# Patient Record
Sex: MALE | Race: WHITE | Employment: FULL TIME | URBAN - NONMETROPOLITAN AREA
[De-identification: names, ages, dates, MRNs, and addresses within clinical notes are randomized per-mention and may not be internally consistent; named-entity substitution may affect disease eponyms.]

---

## 2018-07-30 ENCOUNTER — OFFICE VISIT (OUTPATIENT)
Dept: FAMILY MEDICINE CLINIC | Age: 38
End: 2018-07-30
Payer: COMMERCIAL

## 2018-07-30 VITALS
HEIGHT: 72 IN | WEIGHT: 173 LBS | HEART RATE: 88 BPM | DIASTOLIC BLOOD PRESSURE: 62 MMHG | SYSTOLIC BLOOD PRESSURE: 118 MMHG | BODY MASS INDEX: 23.43 KG/M2

## 2018-07-30 DIAGNOSIS — E86.0 DEHYDRATION: ICD-10-CM

## 2018-07-30 DIAGNOSIS — Z00.00 WELL ADULT EXAM: Primary | ICD-10-CM

## 2018-07-30 DIAGNOSIS — Z13.220 SCREENING, LIPID: ICD-10-CM

## 2018-07-30 DIAGNOSIS — R53.83 FATIGUE, UNSPECIFIED TYPE: ICD-10-CM

## 2018-07-30 LAB
GLUCOSE BLD-MCNC: 151 MG/DL
HBA1C MFR BLD: 5.3 %

## 2018-07-30 PROCEDURE — 99395 PREV VISIT EST AGE 18-39: CPT | Performed by: FAMILY MEDICINE

## 2018-07-30 PROCEDURE — 82962 GLUCOSE BLOOD TEST: CPT | Performed by: FAMILY MEDICINE

## 2018-07-30 PROCEDURE — 83036 HEMOGLOBIN GLYCOSYLATED A1C: CPT | Performed by: FAMILY MEDICINE

## 2018-07-30 RX ORDER — TRAZODONE HYDROCHLORIDE 50 MG/1
50 TABLET ORAL
COMMUNITY
End: 2018-11-08 | Stop reason: SDUPTHER

## 2018-07-30 ASSESSMENT — ENCOUNTER SYMPTOMS: NAUSEA: 0

## 2018-07-30 ASSESSMENT — PATIENT HEALTH QUESTIONNAIRE - PHQ9
2. FEELING DOWN, DEPRESSED OR HOPELESS: 0
SUM OF ALL RESPONSES TO PHQ QUESTIONS 1-9: 0
1. LITTLE INTEREST OR PLEASURE IN DOING THINGS: 0
SUM OF ALL RESPONSES TO PHQ9 QUESTIONS 1 & 2: 0

## 2018-07-30 NOTE — PROGRESS NOTES
Respiratory:        Sleep Apnea, using [facility]PaP since May of 2017 and not going well for him, he is working with Dr. Tisha Weaver is working with him to get parts to make it better. Gastrointestinal: Negative for nausea. Endocrine:        Drinks water all day, has not noticed increase in thirst   Genitourinary: Negative for difficulty urinating and dysuria. Up one time every night for urination   Neurological: Positive for dizziness (Once, last Thursday) and light-headedness (Off and on at different times of the day for the past 7-10 days). Psychiatric/Behavioral: Positive for sleep disturbance (Not sleeping well at night). Only noted with being upright, such as standing up out of the car     Objective:     /62   Pulse 88   Ht 6' (1.829 m)   Wt 173 lb (78.5 kg)   BMI 23.46 kg/m²     Physical Exam   Constitutional: He is oriented to person, place, and time. He appears well-developed and well-nourished. No distress. HENT:   Head: Atraumatic. Eyes: Conjunctivae are normal.   Neck: Neck supple. Carotid bruit is not present. No thyromegaly present. Cardiovascular: Normal rate and regular rhythm. No murmur heard. Pulmonary/Chest: Effort normal and breath sounds normal.   Musculoskeletal:        Right lower leg: He exhibits no swelling. Left lower leg: He exhibits no swelling. Lymphadenopathy:     He has no cervical adenopathy. Neurological: He is alert and oriented to person, place, and time. Vitals reviewed. Noted BMI at 23  - non fasting  3 hours ago ate  A1C 5.3      Assessment:     1. Well adult exam    2. Dehydration    3. Screening, lipid      No results found for this visit on 07/30/18.         Plan:     Orders Placed This Encounter   Procedures    Lipid Panel     Standing Status:   Future     Standing Expiration Date:   7/30/2019     Order Specific Question:   Is Patient Fasting?/# of Hours     Answer:   10-12       No orders of the defined types were placed in this encounter. Reviewed monitoring fluid intake. If not improving with change in fluid intake and limiting salt intake may call back for further evaluation    Encouraged to quit chew    Return in about 3 years (around 7/30/2021), or if symptoms worsen or fail to improve. Discussed use, benefit, and side effects of prescribed medications. All patient questions answered. Pt voiced understanding. Reviewed health maintenance- Tdap pt will check on last done. Instructed to continue current medications, diet and exercise. Patient agreed with treatment plan. Follow up as directed.      Electronically signed by Angelica Robles MD on 7/30/2018

## 2018-08-29 PROBLEM — E86.0 DEHYDRATION: Status: RESOLVED | Noted: 2018-07-30 | Resolved: 2018-08-29

## 2018-11-08 ENCOUNTER — OFFICE VISIT (OUTPATIENT)
Dept: FAMILY MEDICINE CLINIC | Age: 38
End: 2018-11-08
Payer: COMMERCIAL

## 2018-11-08 VITALS
WEIGHT: 170 LBS | HEIGHT: 72 IN | HEART RATE: 64 BPM | DIASTOLIC BLOOD PRESSURE: 62 MMHG | BODY MASS INDEX: 23.03 KG/M2 | SYSTOLIC BLOOD PRESSURE: 118 MMHG

## 2018-11-08 DIAGNOSIS — Z72.0 TOBACCO USE: ICD-10-CM

## 2018-11-08 DIAGNOSIS — F51.01 PRIMARY INSOMNIA: Primary | ICD-10-CM

## 2018-11-08 DIAGNOSIS — M25.562 ACUTE PAIN OF LEFT KNEE: ICD-10-CM

## 2018-11-08 PROCEDURE — 99213 OFFICE O/P EST LOW 20 MIN: CPT | Performed by: FAMILY MEDICINE

## 2018-11-08 RX ORDER — TRAZODONE HYDROCHLORIDE 50 MG/1
50 TABLET ORAL NIGHTLY
Qty: 90 TABLET | Refills: 3 | Status: SHIPPED | OUTPATIENT
Start: 2018-11-08 | End: 2019-11-25 | Stop reason: SDUPTHER

## 2019-01-28 ENCOUNTER — OFFICE VISIT (OUTPATIENT)
Dept: FAMILY MEDICINE CLINIC | Age: 39
End: 2019-01-28
Payer: COMMERCIAL

## 2019-01-28 VITALS
WEIGHT: 170.3 LBS | SYSTOLIC BLOOD PRESSURE: 112 MMHG | HEIGHT: 72 IN | BODY MASS INDEX: 23.07 KG/M2 | OXYGEN SATURATION: 98 % | HEART RATE: 59 BPM | DIASTOLIC BLOOD PRESSURE: 80 MMHG

## 2019-01-28 DIAGNOSIS — Z00.00 WELL ADULT EXAM: Primary | ICD-10-CM

## 2019-01-28 DIAGNOSIS — M25.522 LEFT ELBOW PAIN: ICD-10-CM

## 2019-01-28 DIAGNOSIS — Z72.0 TOBACCO USE: Chronic | ICD-10-CM

## 2019-01-28 PROCEDURE — 99213 OFFICE O/P EST LOW 20 MIN: CPT | Performed by: FAMILY MEDICINE

## 2019-01-28 PROCEDURE — 99395 PREV VISIT EST AGE 18-39: CPT | Performed by: FAMILY MEDICINE

## 2019-01-28 RX ORDER — MELOXICAM 15 MG/1
15 TABLET ORAL DAILY
Qty: 30 TABLET | Refills: 1 | Status: SHIPPED | OUTPATIENT
Start: 2019-01-28 | End: 2019-05-31 | Stop reason: ALTCHOICE

## 2019-02-23 ENCOUNTER — PATIENT MESSAGE (OUTPATIENT)
Dept: FAMILY MEDICINE CLINIC | Age: 39
End: 2019-02-23

## 2019-02-25 DIAGNOSIS — M25.522 LEFT ELBOW PAIN: Primary | ICD-10-CM

## 2019-02-26 ENCOUNTER — TELEPHONE (OUTPATIENT)
Dept: FAMILY MEDICINE CLINIC | Age: 39
End: 2019-02-26

## 2019-02-27 ENCOUNTER — TELEPHONE (OUTPATIENT)
Dept: FAMILY MEDICINE CLINIC | Age: 39
End: 2019-02-27

## 2019-02-27 DIAGNOSIS — M25.529 ELBOW PAIN, UNSPECIFIED LATERALITY: Primary | ICD-10-CM

## 2019-04-16 ENCOUNTER — OFFICE VISIT (OUTPATIENT)
Dept: FAMILY MEDICINE CLINIC | Age: 39
End: 2019-04-16
Payer: COMMERCIAL

## 2019-04-16 VITALS
OXYGEN SATURATION: 98 % | SYSTOLIC BLOOD PRESSURE: 114 MMHG | TEMPERATURE: 97.8 F | WEIGHT: 159 LBS | BODY MASS INDEX: 21.54 KG/M2 | HEART RATE: 120 BPM | HEIGHT: 72 IN | DIASTOLIC BLOOD PRESSURE: 60 MMHG

## 2019-04-16 DIAGNOSIS — M25.522 LEFT ELBOW PAIN: ICD-10-CM

## 2019-04-16 DIAGNOSIS — R21 RASH: Primary | ICD-10-CM

## 2019-04-16 PROCEDURE — 99213 OFFICE O/P EST LOW 20 MIN: CPT | Performed by: FAMILY MEDICINE

## 2019-04-16 RX ORDER — PREDNISONE 20 MG/1
60 TABLET ORAL DAILY
Qty: 15 TABLET | Refills: 0 | Status: SHIPPED | OUTPATIENT
Start: 2019-04-16 | End: 2019-04-21

## 2019-04-16 RX ORDER — LORATADINE 10 MG/1
10 TABLET ORAL DAILY
Qty: 30 TABLET | Refills: 0 | Status: SHIPPED | OUTPATIENT
Start: 2019-04-16 | End: 2019-05-16

## 2019-04-16 ASSESSMENT — PATIENT HEALTH QUESTIONNAIRE - PHQ9
SUM OF ALL RESPONSES TO PHQ QUESTIONS 1-9: 0
SUM OF ALL RESPONSES TO PHQ9 QUESTIONS 1 & 2: 0
1. LITTLE INTEREST OR PLEASURE IN DOING THINGS: 0
SUM OF ALL RESPONSES TO PHQ QUESTIONS 1-9: 0
2. FEELING DOWN, DEPRESSED OR HOPELESS: 0

## 2019-04-16 ASSESSMENT — ENCOUNTER SYMPTOMS
VOMITING: 0
DIARRHEA: 1
NAUSEA: 1

## 2019-04-16 NOTE — PROGRESS NOTES
Sexual Activity    Alcohol use: No     Comment: stopped 2016    Drug use: No    Sexual activity: Not on file   Lifestyle    Physical activity:     Days per week: Not on file     Minutes per session: Not on file    Stress: Not on file   Relationships    Social connections:     Talks on phone: Not on file     Gets together: Not on file     Attends Rastafarian service: Not on file     Active member of club or organization: Not on file     Attends meetings of clubs or organizations: Not on file     Relationship status: Not on file    Intimate partner violence:     Fear of current or ex partner: Not on file     Emotionally abused: Not on file     Physically abused: Not on file     Forced sexual activity: Not on file   Other Topics Concern    Not on file   Social History Narrative    Not on file     Family History   Problem Relation Age of Onset    Other Mother 36        Hodgkins    Diabetes Father     Cancer Maternal Grandmother         lung cancer in smoker    Other Maternal Grandfather         alzheimers     Heart Disease Paternal Grandfather        Subjective:      Review of Systems   Constitutional: Negative for fever. Here with C/O red, raised rash hat itches somewhat over a majority of his body. Rash started yesterday about 1 pm and he has also had nausea and diarrhea with it. No recent travel outside the [place]. Gastrointestinal: Positive for diarrhea and nausea. Negative for vomiting. Skin: Positive for rash. No throat tightness, breathing easy. Objective:     /60   Pulse 120   Temp 97.8 °F (36.6 °C)   Ht 6' (1.829 m)   Wt 159 lb (72.1 kg)   SpO2 98%   BMI 21.56 kg/m²     Physical Exam   Constitutional: He is oriented to person, place, and time. He appears well-developed and well-nourished. No distress. Cardiovascular: Normal rate and regular rhythm. Pulmonary/Chest: Effort normal and breath sounds normal. He has no wheezes.    Musculoskeletal: He exhibits no edema or tenderness (left elbow not tender to palpate. ). Neurological: He is alert and oriented to person, place, and time. Assessment:      Diagnosis Orders   1. Rash  loratadine (CLARITIN) 10 MG tablet     No results found for this visit on 04/16/19. Plan:     No follow-ups on file. Patient Instructions   Avoid hot shower. No orders of the defined types were placed in this encounter.     Orders Placed This Encounter   Medications    loratadine (CLARITIN) 10 MG tablet     Sig: Take 1 tablet by mouth daily     Dispense:  30 tablet     Refill:  0    predniSONE (DELTASONE) 20 MG tablet     Sig: Take 3 tablets by mouth daily for 5 days     Dispense:  15 tablet     Refill:  0      Electronically signed by Vanita Chowdary MD on 4/16/2019 at2:21 PM

## 2019-05-31 ENCOUNTER — OFFICE VISIT (OUTPATIENT)
Dept: ORTHOPEDIC SURGERY | Age: 39
End: 2019-05-31
Payer: COMMERCIAL

## 2019-05-31 VITALS
BODY MASS INDEX: 21.84 KG/M2 | DIASTOLIC BLOOD PRESSURE: 81 MMHG | SYSTOLIC BLOOD PRESSURE: 124 MMHG | HEART RATE: 72 BPM | RESPIRATION RATE: 14 BRPM | WEIGHT: 161 LBS

## 2019-05-31 DIAGNOSIS — G56.21 CUBITAL TUNNEL SYNDROME ON RIGHT: Primary | ICD-10-CM

## 2019-05-31 PROCEDURE — 99203 OFFICE O/P NEW LOW 30 MIN: CPT | Performed by: ORTHOPAEDIC SURGERY

## 2019-05-31 NOTE — PROGRESS NOTES
Subjective:      Patient ID: Juan Antonio Schwartz is a 43 y.o. male. HPI  The patient comes in today with chief complaints of bilateral elbow pain currently worse right than left. This started several months ago. No specific injury but he was doing CrossFit type training. Complaining of pain medial aspect of both elbows. It radiates into the upper arm. Significant numbness in the ring and little digits especially at night. Current Outpatient Medications   Medication Sig Dispense Refill    Misc. Devices MISC 1 each by Does not apply route once as needed (RIGHT ELBOW EXTENSION BRACE) Use as directed. 1 each 0    traZODone (DESYREL) 50 MG tablet Take 1 tablet by mouth nightly May take 2 if needed 90 tablet 3     No current facility-administered medications for this visit. Review of Systems   Musculoskeletal: Positive for arthralgias and myalgias. Neurological: Positive for numbness. No past medical history on file. No past surgical history on file. Family History   Problem Relation Age of Onset    Other Mother 36        Hodgkins    Diabetes Father     Cancer Maternal Grandmother         lung cancer in smoker    Other Maternal Grandfather         alzheimers     Heart Disease Paternal Grandfather      Social History     Tobacco Use    Smoking status: Never Smoker    Smokeless tobacco: Current User     Types: Chew    Tobacco comment: 1 can per week   Substance Use Topics    Alcohol use: No     Comment: stopped 2016    Drug use: No       Objective:     Vitals:    05/31/19 1013   BP: 124/81   Pulse: 72   Resp: 14   Weight: 161 lb (73 kg)     Physical Exam  On exam patient is alert and oriented ×3 and appears well kempt he walks with a normal gait. Exam of the right elbow shows no obvious deformity. There is no pain over the medial epicondyle. There is tenderness along the ulnar nerve just proximal to the medial epicondyle. There is a positive Tinel's.   Positive ulnar nerve stretch test.  Exam of the hand shows MP extension first dorsal osseous abductor pollicis brevis strength all fiber 5. No hyperthenar atrophy. Left-sided exam likewise shows tenderness just proximal to the medial epicondyles over the ulnar nerve. Positive ulnar nerve stretch. Positive Tinel's. 2+ radial pulse bilaterally with brisk refill. The left side likewise shows normal motor and sensory exam.    Radiology:            Impression:        Assessment:     Visit Diagnoses       Codes    Cubital tunnel syndrome on right    -  Primary G56.21           Plan:     Patient has cubital tunnel syndrome bilaterally. He has been in therapy and initially did well. We are going to try him with nighttime splinting starting on the right side.   He will let us know how he is doing     Please be aware that portions of this chart note were created using voice recognition software and that unforseen errors may have occured   Electronically signed by Anne Bradford MD on 5/31/2019 at 10:38 AM

## 2019-11-25 RX ORDER — TRAZODONE HYDROCHLORIDE 50 MG/1
TABLET ORAL
Qty: 90 TABLET | Refills: 1 | Status: SHIPPED | OUTPATIENT
Start: 2019-11-25 | End: 2020-06-22 | Stop reason: SDUPTHER

## 2020-06-08 RX ORDER — TRAZODONE HYDROCHLORIDE 50 MG/1
TABLET ORAL
Qty: 90 TABLET | Refills: 1 | OUTPATIENT
Start: 2020-06-08

## 2020-06-22 ENCOUNTER — OFFICE VISIT (OUTPATIENT)
Dept: FAMILY MEDICINE CLINIC | Age: 40
End: 2020-06-22
Payer: COMMERCIAL

## 2020-06-22 VITALS
WEIGHT: 161.2 LBS | HEIGHT: 72 IN | BODY MASS INDEX: 21.83 KG/M2 | SYSTOLIC BLOOD PRESSURE: 116 MMHG | HEART RATE: 83 BPM | DIASTOLIC BLOOD PRESSURE: 68 MMHG | OXYGEN SATURATION: 97 %

## 2020-06-22 PROBLEM — M79.644 FINGER PAIN, RIGHT: Status: ACTIVE | Noted: 2020-06-22

## 2020-06-22 PROBLEM — G47.9 SLEEP DISTURBANCE: Status: ACTIVE | Noted: 2020-06-22

## 2020-06-22 PROCEDURE — 99213 OFFICE O/P EST LOW 20 MIN: CPT | Performed by: FAMILY MEDICINE

## 2020-06-22 RX ORDER — TRAZODONE HYDROCHLORIDE 50 MG/1
TABLET ORAL
Qty: 90 TABLET | Refills: 1 | Status: SHIPPED | OUTPATIENT
Start: 2020-06-22 | End: 2021-01-30

## 2020-06-22 ASSESSMENT — PATIENT HEALTH QUESTIONNAIRE - PHQ9
SUM OF ALL RESPONSES TO PHQ QUESTIONS 1-9: 0
2. FEELING DOWN, DEPRESSED OR HOPELESS: 0
SUM OF ALL RESPONSES TO PHQ QUESTIONS 1-9: 0
SUM OF ALL RESPONSES TO PHQ9 QUESTIONS 1 & 2: 0
1. LITTLE INTEREST OR PLEASURE IN DOING THINGS: 0

## 2020-06-22 ASSESSMENT — ENCOUNTER SYMPTOMS
SHORTNESS OF BREATH: 0
ABDOMINAL PAIN: 0
WHEEZING: 0
COUGH: 0

## 2020-06-22 NOTE — PROGRESS NOTES
[address] [address]  Dept: [phone]  Dept Fax: [phone]    Zelalem Ward is a 35 y.o. male who presents today for his medical conditions/complaints as noted below. Zelalem Ward c/o of Medication Refill (trazadone refill)      HPI:     HPI  Pt here requesting refill on trazodone   Using for few years per pt, no attempts to cut until doing well past 6 weeks. Pain x 5 weeks right pinky finger after smashing under dumbell. Concern per pt for fracture. Not able to straighten finger tip since injury, no prior injury      BP Readings from Last 3 Encounters:   06/22/20 116/68   05/31/19 124/81   04/16/19 114/60          (goal 120/80)    No past medical history on file. No past surgical history on file. Family History   Problem Relation Age of Onset    Other Mother 36        Hodgkins    Diabetes Father     Cancer Maternal Grandmother         lung cancer in smoker    Other Maternal Grandfather         alzheimers     Heart Disease Paternal Grandfather        Social History     Tobacco Use    Smoking status: Never Smoker    Smokeless tobacco: Current User     Types: Chew    Tobacco comment: 1 can per week   Substance Use Topics    Alcohol use: No     Comment: stopped 2016      Prior to Visit Medications    Medication Sig Taking? Authorizing Provider   traZODone (DESYREL) 50 MG tablet TAKE 1 TABLET NIGHTLY. .MAY TAKE 2 IF NEEDED Yes Marce Sims MD   Misc. Devices MISC 1 each by Does not apply route once as needed (RIGHT ELBOW EXTENSION BRACE) Use as directed.   Celso Brady MD     No Known Allergies    Health Maintenance   Topic Date Due    HIV screen  12/10/1994    DTaP/Tdap/Td vaccine (1 - Tdap) 12/10/1998    Lipid screen  12/10/2019    Flu vaccine (Season Ended) 09/01/2020    Hepatitis A vaccine  Aged Out    Hepatitis B vaccine  Aged Out    Hib vaccine  Aged Out    Meningococcal (ACWY) vaccine  Aged Out    Pneumococcal 0-64 years Vaccine  Aged Out       Subjective:      Review of Systems   Constitutional: Negative for fatigue and fever. Respiratory: Negative for cough, shortness of breath and wheezing. Cardiovascular: Negative for chest pain, palpitations and leg swelling. Gastrointestinal: Negative for abdominal pain. Genitourinary: Negative for frequency and urgency. Musculoskeletal:         Right pinky. Smashed it about 5 weeks ago in between a dumbbell and a rack. Red and swollen still. States you can feel the bone I am pretty sure that I broke it. Pain with it sometimes. Neurological: Negative for dizziness and headaches. Objective:     /68   Pulse 83   Ht 6' 0.01\" (1.829 m)   Wt 161 lb 3.2 oz (73.1 kg)   SpO2 97%   BMI 21.86 kg/m²     Physical Exam  Constitutional:       General: He is not in acute distress. Appearance: Normal appearance. He is not ill-appearing. HENT:      Head: Normocephalic. Cardiovascular:      Rate and Rhythm: Normal rate and regular rhythm. Heart sounds: No murmur. Pulmonary:      Effort: Pulmonary effort is normal. No respiratory distress. Musculoskeletal:         General: Deformity (right pinky finger with boutonere deformity noted DIP. Minimal AP or lateral tenderness, moderate swelling) present. Neurological:      Mental Status: He is alert. Psychiatric:         Mood and Affect: Mood normal.         Assessment:     1. Sleep disturbance    2. Finger pain, right      No results found for this visit on 06/22/20. Plan:     Orders Placed This Encounter   Procedures    XR HAND RIGHT (MIN 3 VIEWS)     Standing Status:   Future     Standing Expiration Date:   6/22/2021     Order Specific Question:   Reason for exam:     Answer:   attention DIP pinky finger           Return in about 1 year (around 6/22/2021) for if medication needed.       Patient Instructions   May call for hand specialty referral if xrays without displaced fracture  Tdap booster when desired   Reviewed with pt likely ligamentous injury to finger and if normal xray will refer to hand specialist for evaluation. Discussed use, benefit, and side effects of prescribed medications. All patient questions answered. Pt voiced understanding. Reviewed health maintenance. Instructed to continue current medications, diet and exercise. Patient agreed with treatment plan. Follow up as directed.      Electronically signed by Leeanne Deutsch MD on 6/22/2020

## 2021-01-28 ENCOUNTER — OFFICE VISIT (OUTPATIENT)
Dept: FAMILY MEDICINE CLINIC | Age: 41
End: 2021-01-28
Payer: COMMERCIAL

## 2021-01-28 VITALS
DIASTOLIC BLOOD PRESSURE: 70 MMHG | HEART RATE: 97 BPM | SYSTOLIC BLOOD PRESSURE: 112 MMHG | HEIGHT: 72 IN | OXYGEN SATURATION: 75 % | BODY MASS INDEX: 21.78 KG/M2 | WEIGHT: 160.8 LBS

## 2021-01-28 DIAGNOSIS — M25.562 CHRONIC PAIN OF LEFT KNEE: Primary | ICD-10-CM

## 2021-01-28 DIAGNOSIS — G89.29 CHRONIC PAIN OF LEFT KNEE: Primary | ICD-10-CM

## 2021-01-28 PROBLEM — M25.561 CHRONIC PAIN OF RIGHT KNEE: Status: ACTIVE | Noted: 2021-01-28

## 2021-01-28 PROCEDURE — 99213 OFFICE O/P EST LOW 20 MIN: CPT | Performed by: FAMILY MEDICINE

## 2021-01-28 RX ORDER — MELOXICAM 15 MG/1
15 TABLET ORAL DAILY
Qty: 30 TABLET | Refills: 1 | Status: SHIPPED | OUTPATIENT
Start: 2021-01-28 | End: 2021-09-16 | Stop reason: ALTCHOICE

## 2021-01-28 ASSESSMENT — PATIENT HEALTH QUESTIONNAIRE - PHQ9
2. FEELING DOWN, DEPRESSED OR HOPELESS: 0
SUM OF ALL RESPONSES TO PHQ QUESTIONS 1-9: 0
1. LITTLE INTEREST OR PLEASURE IN DOING THINGS: 0
SUM OF ALL RESPONSES TO PHQ QUESTIONS 1-9: 0

## 2021-01-28 ASSESSMENT — ENCOUNTER SYMPTOMS
WHEEZING: 0
COUGH: 0
SHORTNESS OF BREATH: 0

## 2021-01-28 NOTE — PATIENT INSTRUCTIONS
Daily medication until better plus 1 week then resume activty   End extension exercised 10 reps 10 times daily. Then when better decrease to 3 times daily  If not improving in 2 weeks to call for PT evaluation.

## 2021-01-28 NOTE — PROGRESS NOTES
[address] [address]  Dept: [phone]  Dept Fax: [phone]    Trini Richards is a 38 y.o. male who presents today for his medical conditions/complaints as noted below. Trini Richards c/o of Knee Pain (left knee. denies issues with it in the past. started to bother him in sept/oct. feels that it was over used with his running and other additional exercising. last few months he hasnt been able to do anything lower body wise with work outs. pain is mostly right in the middle under the knee cap)      HPI:     HPI  Left knee pain past 3-4 months. Behind knee cap  Avid runner though cut lower body out without improvement recently  No known specific injury  Lower body bothers    No medications attempted. No prior similar issues reported. BP Readings from Last 3 Encounters:   01/28/21 112/70   06/22/20 116/68   05/31/19 124/81          (goal 120/80)    No past medical history on file. No past surgical history on file. Family History   Problem Relation Age of Onset    Other Mother 36        Hodgkins    Diabetes Father     Cancer Maternal Grandmother         lung cancer in smoker    Other Maternal Grandfather         alzheimers     Heart Disease Paternal Grandfather        Social History     Tobacco Use    Smoking status: Never Smoker    Smokeless tobacco: Current User     Types: Chew    Tobacco comment: 1 can per week   Substance Use Topics    Alcohol use: No     Comment: stopped 2016      Prior to Visit Medications    Medication Sig Taking? Authorizing Provider   traZODone (DESYREL) 50 MG tablet TAKE 1 TABLET NIGHTLY.  May taper if able Yes Trevor Hayes MD     No Known Allergies    Health Maintenance   Topic Date Due    Hepatitis C screen  1979    HIV screen  12/10/1994    DTaP/Tdap/Td vaccine (1 - Tdap) 12/10/1998    Lipid screen  12/10/2019    Flu vaccine (1) 09/01/2020    Hepatitis A vaccine  Aged Out    Hepatitis B vaccine  Aged Out    Hib vaccine  Aged Out    Meningococcal (ACWY) vaccine  Aged Out    Pneumococcal 0-64 years Vaccine  Aged Out       Subjective:      Review of Systems   Constitutional: Negative for fatigue and fever. Respiratory: Negative for cough, shortness of breath and wheezing. Cardiovascular: Negative for chest pain, palpitations and leg swelling. Musculoskeletal:        Knee pain starting Sept/Oct.    Neurological: Negative for dizziness and headaches. Objective:     /70   Pulse 97   Ht 6' (1.829 m)   Wt 160 lb 12.8 oz (72.9 kg)   SpO2 (!) 75%   BMI 21.81 kg/m²     Physical Exam  HENT:      Head: Normocephalic. Eyes:      Conjunctiva/sclera: Conjunctivae normal.   Pulmonary:      Effort: Pulmonary effort is normal. No respiratory distress. Musculoskeletal:         General: No swelling, tenderness or deformity. Right lower leg: No edema. Left lower leg: No edema. Comments: No JLT, normal ROM, normal ligaments, no quad inhibition. Neurological:      Mental Status: He is alert. Psychiatric:         Thought Content: Thought content normal.         Judgment: Judgment normal.       ER visit noted earlier this month with SOB sensation    Assessment:     1. Chronic pain of left knee      No results found for this visit on 01/28/21. Plan:     Orders Placed This Encounter   Procedures    XR KNEE LEFT (3 VIEWS)     Standing Status:   Future     Standing Expiration Date:   1/28/2022       No follow-ups on file. Patient Instructions   Daily medication until better plus 1 week then resume activty   End extension exercised 10 reps 10 times daily. Then when better decrease to 3 times daily  If not improving in 2 weeks to call for PT evaluation. Discussed use, benefit, and side effects of prescribed medications. All patient questions answered. Pt voiced understanding. Reviewed health maintenance.   Instructed to continue current medications, diet and exercise. Patient agreed with treatment plan. Follow up as directed.      Electronically signed by Lorraine Hill MD on 1/28/2021

## 2021-01-29 DIAGNOSIS — G47.9 SLEEP DISTURBANCE: ICD-10-CM

## 2021-01-29 NOTE — TELEPHONE ENCOUNTER
Alicja Linder is requesting a refill on the following medication(s):  Requested Prescriptions     Pending Prescriptions Disp Refills    traZODone (DESYREL) 50 MG tablet [Pharmacy Med Name: TRAZODONE 50 MG TABLET] 90 tablet 1     Sig: TAKE 1 TABLET BY MOUTH EVERY NIGHT **TAPER IF ABLE**       Last Visit Date (If Applicable):  9/04/2956    Next Visit Date:    Visit date not found

## 2021-01-30 RX ORDER — TRAZODONE HYDROCHLORIDE 50 MG/1
TABLET ORAL
Qty: 90 TABLET | Refills: 1 | Status: SHIPPED | OUTPATIENT
Start: 2021-01-30 | End: 2021-08-10

## 2021-02-22 DIAGNOSIS — M25.562 CHRONIC PAIN OF LEFT KNEE: Primary | ICD-10-CM

## 2021-02-22 DIAGNOSIS — G89.29 CHRONIC PAIN OF LEFT KNEE: Primary | ICD-10-CM

## 2021-09-14 NOTE — PROGRESS NOTES
[address] [address]  Dept: [phone]  Dept Fax: [phone]    Shannon Serrano is a 38 y.o. male who presents today for his medical conditions/complaints as noted below. Shannon Serrano c/o of Insomnia (Discuss Trazadone- needs refill. He has not gotten the Covid Vaccine.)      HPI:     HPI  Here for follow up of sleep disturbance  Taking all medications regularly- may consider weaning and poor symptoms returned when held  No side effects noted    No other complaint currently, usual aches continue    BP Readings from Last 3 Encounters:   09/16/21 102/70   01/28/21 112/70   06/22/20 116/68          (goal 120/80)    No past medical history on file. No past surgical history on file. Family History   Problem Relation Age of Onset    Other Mother 36        Hodgkins    Diabetes Father     Cancer Maternal Grandmother         lung cancer in smoker    Other Maternal Grandfather         alzheimers     Heart Disease Paternal Grandfather        Social History     Tobacco Use    Smoking status: Never Smoker    Smokeless tobacco: Current User     Types: Chew    Tobacco comment: 1 can per week   Substance Use Topics    Alcohol use: No     Comment: stopped 2016      Prior to Visit Medications    Medication Sig Taking?  Authorizing Provider   traZODone (DESYREL) 50 MG tablet take 1 tablet by mouth EVERY NIGHT **TAPER IF ABLE**  Ted Krishna MD   meloxicam (MOBIC) 15 MG tablet Take 1 tablet by mouth daily  Candace Miranda MD     No Known Allergies    Health Maintenance   Topic Date Due    Hepatitis C screen  Never done    HIV screen  Never done    DTaP/Tdap/Td vaccine (1 - Tdap) Never done    Lipid screen  Never done    Flu vaccine (1) Never done    COVID-19 Vaccine (1) 09/05/2022 (Originally 12/10/1991)    Hepatitis A vaccine  Aged Out    Hepatitis B vaccine  Aged Out    Hib vaccine  Aged Out    Meningococcal (ACWY) vaccine  Aged Out    this encounter. Return in about 2 years (around 9/16/2023) for sleep disturbance. Patient Instructions   May consider taper in future if desired  Tdap, covid when desired       Discussed use, benefit, and side effects of prescribed medications. All patient questions answered. Pt voiced understanding. Reviewed health maintenance covid- extensive discussion with pt, Tdap. Instructed to continue current medications, diet and exercise. Patient agreed with treatment plan. Follow up as directed.      Electronically signed by Farzaneh Hatch MD on 9/16/2021

## 2021-09-16 ENCOUNTER — OFFICE VISIT (OUTPATIENT)
Dept: FAMILY MEDICINE CLINIC | Age: 41
End: 2021-09-16
Payer: COMMERCIAL

## 2021-09-16 VITALS
WEIGHT: 163.2 LBS | HEIGHT: 72 IN | SYSTOLIC BLOOD PRESSURE: 102 MMHG | BODY MASS INDEX: 22.11 KG/M2 | DIASTOLIC BLOOD PRESSURE: 70 MMHG | OXYGEN SATURATION: 98 % | HEART RATE: 60 BPM | TEMPERATURE: 96.2 F | RESPIRATION RATE: 16 BRPM

## 2021-09-16 DIAGNOSIS — Z72.0 TOBACCO USE: Chronic | ICD-10-CM

## 2021-09-16 DIAGNOSIS — G47.9 SLEEP DISTURBANCE: Primary | ICD-10-CM

## 2021-09-16 PROCEDURE — 99213 OFFICE O/P EST LOW 20 MIN: CPT | Performed by: FAMILY MEDICINE

## 2021-09-16 RX ORDER — TRAZODONE HYDROCHLORIDE 50 MG/1
TABLET ORAL
Qty: 90 TABLET | Refills: 4 | Status: SHIPPED | OUTPATIENT
Start: 2021-09-16

## 2021-09-16 SDOH — ECONOMIC STABILITY: FOOD INSECURITY: WITHIN THE PAST 12 MONTHS, THE FOOD YOU BOUGHT JUST DIDN'T LAST AND YOU DIDN'T HAVE MONEY TO GET MORE.: NEVER TRUE

## 2021-09-16 SDOH — ECONOMIC STABILITY: FOOD INSECURITY: WITHIN THE PAST 12 MONTHS, YOU WORRIED THAT YOUR FOOD WOULD RUN OUT BEFORE YOU GOT MONEY TO BUY MORE.: NEVER TRUE

## 2021-09-16 ASSESSMENT — ENCOUNTER SYMPTOMS
SINUS PRESSURE: 0
CONSTIPATION: 0
COUGH: 0
SHORTNESS OF BREATH: 0
DIARRHEA: 0

## 2021-09-16 ASSESSMENT — SOCIAL DETERMINANTS OF HEALTH (SDOH): HOW HARD IS IT FOR YOU TO PAY FOR THE VERY BASICS LIKE FOOD, HOUSING, MEDICAL CARE, AND HEATING?: NOT HARD AT ALL

## 2021-09-16 ASSESSMENT — PATIENT HEALTH QUESTIONNAIRE - PHQ9
1. LITTLE INTEREST OR PLEASURE IN DOING THINGS: 0
SUM OF ALL RESPONSES TO PHQ QUESTIONS 1-9: 0
2. FEELING DOWN, DEPRESSED OR HOPELESS: 0
SUM OF ALL RESPONSES TO PHQ QUESTIONS 1-9: 0
SUM OF ALL RESPONSES TO PHQ QUESTIONS 1-9: 0
SUM OF ALL RESPONSES TO PHQ9 QUESTIONS 1 & 2: 0